# Patient Record
Sex: MALE | Race: ASIAN | NOT HISPANIC OR LATINO | URBAN - METROPOLITAN AREA
[De-identification: names, ages, dates, MRNs, and addresses within clinical notes are randomized per-mention and may not be internally consistent; named-entity substitution may affect disease eponyms.]

---

## 2023-12-10 ENCOUNTER — EMERGENCY (EMERGENCY)
Facility: HOSPITAL | Age: 28
LOS: 1 days | Discharge: HOME | End: 2023-12-10
Attending: EMERGENCY MEDICINE
Payer: MEDICAID

## 2023-12-10 VITALS
DIASTOLIC BLOOD PRESSURE: 79 MMHG | HEIGHT: 67 IN | OXYGEN SATURATION: 97 % | TEMPERATURE: 98 F | RESPIRATION RATE: 20 BRPM | WEIGHT: 110.01 LBS | HEART RATE: 89 BPM | SYSTOLIC BLOOD PRESSURE: 116 MMHG

## 2023-12-10 DIAGNOSIS — F43.20 ADJUSTMENT DISORDER, UNSPECIFIED: ICD-10-CM

## 2023-12-10 LAB
ALBUMIN SERPL ELPH-MCNC: 5.4 G/DL — HIGH (ref 3.3–5)
ALBUMIN SERPL ELPH-MCNC: 5.4 G/DL — HIGH (ref 3.3–5)
ALP SERPL-CCNC: 71 U/L — SIGNIFICANT CHANGE UP (ref 40–120)
ALP SERPL-CCNC: 71 U/L — SIGNIFICANT CHANGE UP (ref 40–120)
ALT FLD-CCNC: 9 U/L — LOW (ref 10–45)
ALT FLD-CCNC: 9 U/L — LOW (ref 10–45)
ANION GAP SERPL CALC-SCNC: 12 MMOL/L — SIGNIFICANT CHANGE UP (ref 5–17)
ANION GAP SERPL CALC-SCNC: 12 MMOL/L — SIGNIFICANT CHANGE UP (ref 5–17)
APAP SERPL-MCNC: <15 UG/ML — SIGNIFICANT CHANGE UP (ref 10–30)
APAP SERPL-MCNC: <15 UG/ML — SIGNIFICANT CHANGE UP (ref 10–30)
AST SERPL-CCNC: 14 U/L — SIGNIFICANT CHANGE UP (ref 10–40)
AST SERPL-CCNC: 14 U/L — SIGNIFICANT CHANGE UP (ref 10–40)
BASOPHILS # BLD AUTO: 0.05 K/UL — SIGNIFICANT CHANGE UP (ref 0–0.2)
BASOPHILS # BLD AUTO: 0.05 K/UL — SIGNIFICANT CHANGE UP (ref 0–0.2)
BASOPHILS NFR BLD AUTO: 1.3 % — SIGNIFICANT CHANGE UP (ref 0–2)
BASOPHILS NFR BLD AUTO: 1.3 % — SIGNIFICANT CHANGE UP (ref 0–2)
BILIRUB SERPL-MCNC: 1.2 MG/DL — SIGNIFICANT CHANGE UP (ref 0.2–1.2)
BILIRUB SERPL-MCNC: 1.2 MG/DL — SIGNIFICANT CHANGE UP (ref 0.2–1.2)
BUN SERPL-MCNC: 10 MG/DL — SIGNIFICANT CHANGE UP (ref 7–23)
BUN SERPL-MCNC: 10 MG/DL — SIGNIFICANT CHANGE UP (ref 7–23)
CALCIUM SERPL-MCNC: 9.9 MG/DL — SIGNIFICANT CHANGE UP (ref 8.4–10.5)
CALCIUM SERPL-MCNC: 9.9 MG/DL — SIGNIFICANT CHANGE UP (ref 8.4–10.5)
CHLORIDE SERPL-SCNC: 103 MMOL/L — SIGNIFICANT CHANGE UP (ref 96–108)
CHLORIDE SERPL-SCNC: 103 MMOL/L — SIGNIFICANT CHANGE UP (ref 96–108)
CO2 SERPL-SCNC: 26 MMOL/L — SIGNIFICANT CHANGE UP (ref 22–31)
CO2 SERPL-SCNC: 26 MMOL/L — SIGNIFICANT CHANGE UP (ref 22–31)
CREAT SERPL-MCNC: 0.88 MG/DL — SIGNIFICANT CHANGE UP (ref 0.5–1.3)
CREAT SERPL-MCNC: 0.88 MG/DL — SIGNIFICANT CHANGE UP (ref 0.5–1.3)
EGFR: 122 ML/MIN/1.73M2 — SIGNIFICANT CHANGE UP
EGFR: 122 ML/MIN/1.73M2 — SIGNIFICANT CHANGE UP
EOSINOPHIL # BLD AUTO: 0.02 K/UL — SIGNIFICANT CHANGE UP (ref 0–0.5)
EOSINOPHIL # BLD AUTO: 0.02 K/UL — SIGNIFICANT CHANGE UP (ref 0–0.5)
EOSINOPHIL NFR BLD AUTO: 0.5 % — SIGNIFICANT CHANGE UP (ref 0–6)
EOSINOPHIL NFR BLD AUTO: 0.5 % — SIGNIFICANT CHANGE UP (ref 0–6)
ETHANOL SERPL-MCNC: <10 MG/DL — SIGNIFICANT CHANGE UP (ref 0–10)
ETHANOL SERPL-MCNC: <10 MG/DL — SIGNIFICANT CHANGE UP (ref 0–10)
FLUAV AG NPH QL: SIGNIFICANT CHANGE UP
FLUAV AG NPH QL: SIGNIFICANT CHANGE UP
FLUBV AG NPH QL: SIGNIFICANT CHANGE UP
FLUBV AG NPH QL: SIGNIFICANT CHANGE UP
GLUCOSE SERPL-MCNC: 91 MG/DL — SIGNIFICANT CHANGE UP (ref 70–99)
GLUCOSE SERPL-MCNC: 91 MG/DL — SIGNIFICANT CHANGE UP (ref 70–99)
HCT VFR BLD CALC: 47.7 % — SIGNIFICANT CHANGE UP (ref 39–50)
HCT VFR BLD CALC: 47.7 % — SIGNIFICANT CHANGE UP (ref 39–50)
HGB BLD-MCNC: 16 G/DL — SIGNIFICANT CHANGE UP (ref 13–17)
HGB BLD-MCNC: 16 G/DL — SIGNIFICANT CHANGE UP (ref 13–17)
IMM GRANULOCYTES NFR BLD AUTO: 0.3 % — SIGNIFICANT CHANGE UP (ref 0–0.9)
IMM GRANULOCYTES NFR BLD AUTO: 0.3 % — SIGNIFICANT CHANGE UP (ref 0–0.9)
LYMPHOCYTES # BLD AUTO: 1.73 K/UL — SIGNIFICANT CHANGE UP (ref 1–3.3)
LYMPHOCYTES # BLD AUTO: 1.73 K/UL — SIGNIFICANT CHANGE UP (ref 1–3.3)
LYMPHOCYTES # BLD AUTO: 45.6 % — HIGH (ref 13–44)
LYMPHOCYTES # BLD AUTO: 45.6 % — HIGH (ref 13–44)
MCHC RBC-ENTMCNC: 30 PG — SIGNIFICANT CHANGE UP (ref 27–34)
MCHC RBC-ENTMCNC: 30 PG — SIGNIFICANT CHANGE UP (ref 27–34)
MCHC RBC-ENTMCNC: 33.5 GM/DL — SIGNIFICANT CHANGE UP (ref 32–36)
MCHC RBC-ENTMCNC: 33.5 GM/DL — SIGNIFICANT CHANGE UP (ref 32–36)
MCV RBC AUTO: 89.3 FL — SIGNIFICANT CHANGE UP (ref 80–100)
MCV RBC AUTO: 89.3 FL — SIGNIFICANT CHANGE UP (ref 80–100)
MONOCYTES # BLD AUTO: 0.32 K/UL — SIGNIFICANT CHANGE UP (ref 0–0.9)
MONOCYTES # BLD AUTO: 0.32 K/UL — SIGNIFICANT CHANGE UP (ref 0–0.9)
MONOCYTES NFR BLD AUTO: 8.4 % — SIGNIFICANT CHANGE UP (ref 2–14)
MONOCYTES NFR BLD AUTO: 8.4 % — SIGNIFICANT CHANGE UP (ref 2–14)
NEUTROPHILS # BLD AUTO: 1.66 K/UL — LOW (ref 1.8–7.4)
NEUTROPHILS # BLD AUTO: 1.66 K/UL — LOW (ref 1.8–7.4)
NEUTROPHILS NFR BLD AUTO: 43.9 % — SIGNIFICANT CHANGE UP (ref 43–77)
NEUTROPHILS NFR BLD AUTO: 43.9 % — SIGNIFICANT CHANGE UP (ref 43–77)
NRBC # BLD: 0 /100 WBCS — SIGNIFICANT CHANGE UP (ref 0–0)
NRBC # BLD: 0 /100 WBCS — SIGNIFICANT CHANGE UP (ref 0–0)
PLATELET # BLD AUTO: 263 K/UL — SIGNIFICANT CHANGE UP (ref 150–400)
PLATELET # BLD AUTO: 263 K/UL — SIGNIFICANT CHANGE UP (ref 150–400)
POTASSIUM SERPL-MCNC: 4.4 MMOL/L — SIGNIFICANT CHANGE UP (ref 3.5–5.3)
POTASSIUM SERPL-MCNC: 4.4 MMOL/L — SIGNIFICANT CHANGE UP (ref 3.5–5.3)
POTASSIUM SERPL-SCNC: 4.4 MMOL/L — SIGNIFICANT CHANGE UP (ref 3.5–5.3)
POTASSIUM SERPL-SCNC: 4.4 MMOL/L — SIGNIFICANT CHANGE UP (ref 3.5–5.3)
PROT SERPL-MCNC: 8.4 G/DL — HIGH (ref 6–8.3)
PROT SERPL-MCNC: 8.4 G/DL — HIGH (ref 6–8.3)
RBC # BLD: 5.34 M/UL — SIGNIFICANT CHANGE UP (ref 4.2–5.8)
RBC # BLD: 5.34 M/UL — SIGNIFICANT CHANGE UP (ref 4.2–5.8)
RBC # FLD: 12.3 % — SIGNIFICANT CHANGE UP (ref 10.3–14.5)
RBC # FLD: 12.3 % — SIGNIFICANT CHANGE UP (ref 10.3–14.5)
RSV RNA NPH QL NAA+NON-PROBE: SIGNIFICANT CHANGE UP
RSV RNA NPH QL NAA+NON-PROBE: SIGNIFICANT CHANGE UP
SALICYLATES SERPL-MCNC: <2 MG/DL — LOW (ref 15–30)
SALICYLATES SERPL-MCNC: <2 MG/DL — LOW (ref 15–30)
SARS-COV-2 RNA SPEC QL NAA+PROBE: SIGNIFICANT CHANGE UP
SARS-COV-2 RNA SPEC QL NAA+PROBE: SIGNIFICANT CHANGE UP
SODIUM SERPL-SCNC: 141 MMOL/L — SIGNIFICANT CHANGE UP (ref 135–145)
SODIUM SERPL-SCNC: 141 MMOL/L — SIGNIFICANT CHANGE UP (ref 135–145)
TSH SERPL-MCNC: 0.85 UIU/ML — SIGNIFICANT CHANGE UP (ref 0.27–4.2)
TSH SERPL-MCNC: 0.85 UIU/ML — SIGNIFICANT CHANGE UP (ref 0.27–4.2)
WBC # BLD: 3.79 K/UL — LOW (ref 3.8–10.5)
WBC # BLD: 3.79 K/UL — LOW (ref 3.8–10.5)
WBC # FLD AUTO: 3.79 K/UL — LOW (ref 3.8–10.5)
WBC # FLD AUTO: 3.79 K/UL — LOW (ref 3.8–10.5)

## 2023-12-10 PROCEDURE — 80053 COMPREHEN METABOLIC PANEL: CPT

## 2023-12-10 PROCEDURE — 84443 ASSAY THYROID STIM HORMONE: CPT

## 2023-12-10 PROCEDURE — 99285 EMERGENCY DEPT VISIT HI MDM: CPT

## 2023-12-10 PROCEDURE — 80307 DRUG TEST PRSMV CHEM ANLYZR: CPT

## 2023-12-10 PROCEDURE — 87637 SARSCOV2&INF A&B&RSV AMP PRB: CPT

## 2023-12-10 PROCEDURE — 36415 COLL VENOUS BLD VENIPUNCTURE: CPT

## 2023-12-10 PROCEDURE — 85025 COMPLETE CBC W/AUTO DIFF WBC: CPT

## 2023-12-10 PROCEDURE — 93005 ELECTROCARDIOGRAM TRACING: CPT

## 2023-12-10 NOTE — ED PROVIDER NOTE - PROGRESS NOTE DETAILS
Jade Cervanets DO PGY-3  Discussed with psych, cleared for discharge home. Patient denies SI, had collateral obtained and no safety concerns from family who lives with patient.     Discussed the plan for discharge home with the patient. Advised return precautions for any alarming or worsening symptoms, or any other concerns. Recommended that the patient call their primary care doctor today, inform them of their visit to the ER, and to obtain repeat evaluation from their PCP in the next 1-3 days. Patient expresses understanding and agrees with the plan for follow up. At the time of discharge the patient remained stable, in no acute distress. Jade Cervantes DO PGY-3  Discussed with psych, cleared for discharge home. Patient denies SI, had collateral obtained and no safety concerns from family who lives with patient.     Discussed the plan for discharge home with the patient. Advised return precautions for any alarming or worsening symptoms, or any other concerns. Recommended that the patient call their primary care doctor today, inform them of their visit to the ER, and to obtain repeat evaluation from their PCP in the next 1-3 days. Patient expresses understanding and agrees with the plan for follow up. At the time of discharge the patient remained stable, in no acute distress.

## 2023-12-10 NOTE — ED BEHAVIORAL HEALTH ASSESSMENT NOTE - SAFETY PLAN ADDT'L DETAILS
Education provided regarding environmental safety / lethal means restriction/Provision of National Suicide Prevention Lifeline 9-223-366-TALK (1269) Education provided regarding environmental safety / lethal means restriction/Provision of National Suicide Prevention Lifeline 0-001-530-TALK (7959)

## 2023-12-10 NOTE — ED PROVIDER NOTE - ATTENDING CONTRIBUTION TO CARE
Patient  is a 28-year-old male with no chronic medical problems here for psychiatric evaluation secondary to 6 months of depressive symptoms and suicidal thoughts.  Patient states he does not take any medications, does not see a therapist or psychiatrist but wants to be evaluated and is open to treatment.  He states he has 2 dogs and his neighbor recently reported his animals 3 days ago and he needs an emotional support letter to keep one of his dogs because it would "kill him" if he had to get rid of his dogs.  He reports that everything started after a relationship he was in with a woman in [place] did not work out.  He states he spent a lot of money, used up his savings and his girlfriend in [place] broke up with him.  He denies any drug use.  He reports drinking about once a week. Patient states he has lost about 10 to 20 pounds in the past 6 months.      VS noted  Gen.  no acute distress, flat affect  HEENT: EOMI, mmm  Lungs: CTAB/L no C/ W /R   CVS: RRR   Abd; Soft non tender, non distended   Ext: no edema  Skin: no rash  Neuro AAOx3 non focal clear speech  a/p: depression, suicidal ideations -   Concern for major depression.  Patient has a flat affect.  He is requesting psychiatric evaluation for his symptoms.  Plan for medical clearance and psych consult.  - Jacinto FIGUEROA

## 2023-12-10 NOTE — ED BEHAVIORAL HEALTH ASSESSMENT NOTE - CURRENT PLAN:
Quality 110: Preventive Care And Screening: Influenza Immunization: Influenza immunization was not ordered or administered, reason not given
Quality 410: Psoriasis Clinical Response To Oral Systemic Or Biologic Mediations: Patient has been on biologic or system therapy for less than 6 months
Detail Level: Detailed
None known

## 2023-12-10 NOTE — ED BEHAVIORAL HEALTH ASSESSMENT NOTE - HPI (INCLUDE ILLNESS QUALITY, SEVERITY, DURATION, TIMING, CONTEXT, MODIFYING FACTORS, ASSOCIATED SIGNS AND SYMPTOMS)
This is a 28M with no past medical or psychiatric history BIB self to the ED for suicidal thoughts. Psychiatry consulted to assess for inpatient admission secondary to suicidal ideation.  Chart reviewed. Patient assessed at bedside. Patient stated he has been living with feelings of depression and anxiety (ruminations, anxiety, anhedonia, amotivation) for many years, but feelings began to worsen approximately 9 months ago after his girlfriend broke up with him. At that time he had passive thoughts of death, no intent or plan, but then started taking care of pets (2 dogs and a cat) who he credits for helping him regain motivation and energy to go outside and be social. He previously had therapy in college but did not seek psychotherapy after graduation. He denied past SA, psychiatric medication treatment, psychiatric hospitalizations or family history of SA or psychiatric conditions.  He currently denies SI/HI, AH/VH. He denied firearms possession.     After extensive interviewing, he stated his goal in coming to the ED was to attain a Emotional Support Animal (CHECO) support letter to provide to the landlord so he can keep his pets. Patient showed writer a cell phone picture of the letter they had sent him requesting a response by 12/8/2023 or the matter will be forwarded to the building's legal team. Patient was amenable to requesting said letter from his PCP or after establishing OP care at [facility].    Collateral contacted (several numbers provided: Tc, friend ([phone]), rToy, former coworker and friend ([phone]) and mother Ms. Masterson, [phone] (home number in sunrise is incorrect)), was able to contact Tc, who stated that patient was depressed but depression was not severe, and he was happy that he was amenable to therapy. Tc stated that he had no concerns about patient's safety. This is a 28M with no past medical or psychiatric history BIB self to the ED for suicidal thoughts. Psychiatry consulted to assess for inpatient admission secondary to suicidal ideation.  Chart reviewed. Patient assessed at bedside. Patient stated he has been living with feelings of depression and anxiety (ruminations, anxiety, anhedonia, amotivation) for many years, but feelings began to worsen approximately 9 months ago after his girlfriend broke up with him. At that time he had passive thoughts of death, no intent or plan, but then started taking care of pets (2 dogs and a cat) who he credits for helping him regain motivation and energy to go outside and be social. He previously had therapy in college but did not seek psychotherapy after graduation. He denied past SA, psychiatric medication treatment, psychiatric hospitalizations or family history of SA or psychiatric conditions.  He currently denies SI/HI, AH/VH. He denied firearms possession.     After extensive interviewing, he stated his goal in coming to the ED was to attain a Emotional Support Animal (CHECO) support letter to provide to the landlord so he can keep his pets. Patient showed writer a cell phone picture of the letter they had sent him requesting a response by 12/8/2023 or the matter will be forwarded to the building's legal team. Patient was amenable to requesting said letter from his PCP or after establishing OP care at [facility].    Collateral contacted (several numbers provided: Tc, friend ([phone]), Troy, former coworker and friend ([phone]) and mother Ms. Masterson, [phone] (home number in sunrise is incorrect)), was able to contact Tc, who stated that patient was depressed but depression was not severe, and he was happy that he was amenable to therapy. Tc stated that he had no concerns about patient's safety.

## 2023-12-10 NOTE — ED BEHAVIORAL HEALTH ASSESSMENT NOTE - REFERRAL / APPOINTMENT DETAILS
[facility] crisis clinic [facility] crisis clinic

## 2023-12-10 NOTE — ED ADULT NURSE NOTE - NSFALLUNIVINTERV_ED_ALL_ED
Bed/Stretcher in lowest position, wheels locked, appropriate side rails in place/Call bell, personal items and telephone in reach/Instruct patient to call for assistance before getting out of bed/chair/stretcher/Non-slip footwear applied when patient is off stretcher/[place] to call system/Physically safe environment - no spills, clutter or unnecessary equipment/Purposeful proactive rounding/Room/bathroom lighting operational, light cord in reach Bed/Stretcher in lowest position, wheels locked, appropriate side rails in place/Call bell, personal items and telephone in reach/Instruct patient to call for assistance before getting out of bed/chair/stretcher/Non-slip footwear applied when patient is off stretcher/[place] to call system/Physically safe environment - no spills, clutter or unnecessary equipment/Purposeful proactive rounding/Room/bathroom lighting operational, light cord in reach

## 2023-12-10 NOTE — ED PROVIDER NOTE - NSFOLLOWUPINSTRUCTIONS_ED_ALL_ED_FT
An Crisis Center on [facility] Information:    -Walk-in hours: Monday to Friday, 9am to 3pm   -Almost all walk-in patients will be able to see a psych prescriber the same day   -Scheduled, non-urgent, evening remote/virtual appointments are available on a limited basis. Call our  to inquire about these: [phone]. A crisis center clinician screens these requests in the late afternoon and if appropriate it takes a few days to set-up.   -Visits take about 2 to 4 hours total   -Mornings are the best time for patients to arrive    For Telehealth options try:  [facility]: [email or web address] (to access psychiatrist or therapist)  [facility]: [email or web address] (to access psychiatrist or therapist)  [facility]: [email or web address] (Largest online therapy group) An Crisis Center on [facility] Information:    -Walk-in hours: Monday to Friday, 9am to 3pm   -Almost all walk-in patients will be able to see a psych prescriber the same day   -Scheduled, non-urgent, evening remote/virtual appointments are available on a limited basis. Call our  to inquire about these: [phone]. A crisis center clinician screens these requests in the late afternoon and if appropriate it takes a few days to set-up.   -Visits take about 2 to 4 hours total   -Mornings are the best time for patients to arrive    For Telehealth options try:  [employer] [employer]: [email or web address] (to access psychiatrist or therapist)  [facility]: [email or web address] (to access psychiatrist or therapist)  [facility]: [email or web address] (Largest online therapy group)

## 2023-12-10 NOTE — ED BEHAVIORAL HEALTH ASSESSMENT NOTE - RISK ASSESSMENT
Risk factors: not receiving treatment, ?legal problems, history of trauma, acute psychosocial stressors    Protective factors: no current SIIP/HIIP, no h/o SA/SIB, no h/o psych admissions, no access to weapons, no active substance use disorder, good physical health, domiciled, help-seeking behaviors  At this time, patient is at low imminent risk of harm to self or others and does not meet criteria for inpatient admission.

## 2023-12-10 NOTE — ED ADULT NURSE REASSESSMENT NOTE - NS ED NURSE REASSESS COMMENT FT1
Security at bedside securing belongings, patient wanded. Valuables secured in safe envelope [id number]. Dr. Cervantes states OK for patient to keep eye glasses. Security at bedside securing belongings, patient wanded. Valuables secured in safe envelope [id number]. Dr. Cervantes states OK for patient to keep eye glasses.

## 2023-12-10 NOTE — ED PROVIDER NOTE - PATIENT PORTAL LINK FT
You can access the FollowMyHealth Patient Portal offered by [facility] by registering at the following website: [email or web address]. By joining [facility]’s FollowMyHealth portal, you will also be able to view your health information using other applications (apps) compatible with our system. You can access the FollowMyHealth Patient Portal offered by [facility] by registering at the following website: [email or web address]. By joining [facility]’s FollowMyHealth portal, you will also be able to view your health information using other applications (apps) compatible with our system.
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
## 2023-12-10 NOTE — ED BEHAVIORAL HEALTH ASSESSMENT NOTE - NSBHATTESTCOMMENTATTENDFT_PSY_A_CORE
This is a 28M with no past medical or psychiatric history BIB self to the ED for suicidal thoughts. Psychiatry consulted to assess for inpatient admission secondary to suicidal ideation.  Chart reviewed. Patient assessed at bedside. Patient stated he has been living with feelings of depression and anxiety (ruminations, anxiety, anhedonia, amotivation) for many years, but feelings began to worsen approximately 9 months ago after his girlfriend broke up with him. At that time he had passive thoughts of death, no intent or plan, but then started taking care of pets (2 dogs and a cat) who he credits for helping him regain motivation and energy to go outside and be social. He previously had therapy in college but did not seek psychotherapy after graduation. He denied past SA, psychiatric medication treatment, psychiatric hospitalizations or family history of SA or psychiatric conditions.  He currently denies SI/HI, AH/VH. He denied firearms possession.     After extensive interviewing, he stated his goal in coming to the ED was to attain a Emotional Support Animal (CHECO) support letter to provide to the landlord so he can keep his pets. Patient showed writer a cell phone picture of the letter they had sent him requesting a response by 12/8/2023 or the matter will be forwarded to the building's legal team. Patient was amenable to requesting said letter from his PCP or after establishing OP care at [facility]. pt can be d/c home This is a 28M with no past medical or psychiatric history BIB self to the ED for suicidal thoughts. Psychiatry consulted to assess for inpatient admission secondary to suicidal ideation.  Chart reviewed. Patient assessed at bedside. Patient stated he has been living with feelings of depression and anxiety (ruminations, anxiety, anhedonia, amotivation) for many years, but feelings began to worsen approximately 9 months ago after his girlfriend broke up with him. At that time he had passive thoughts of death, no intent or plan, but then started taking care of pets (2 dogs and a cat) who he credits for helping him regain motivation and energy to go outside and be social. He previously had therapy in college but did not seek psychotherapy after graduation. He denied past SA, psychiatric medication treatment, psychiatric hospitalizations or family history of SA or psychiatric conditions.  He currently denies SI/HI, AH/VH. He denied firearms possession.     After extensive interviewing, he stated his goal in coming to the ED was to attain a Emotional Support Animal (CHECO) support letter to provide to the landlord so he can keep his pets. Patient showed writer a cell phone picture of the letter they had sent him requesting a response by 12/8/2023 or the matter will be forwarded to the building's legal team. Patient was amenable to requesting said letter from his PCP or after establishing OP care at [facility]. pt can be d/c home
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
## 2023-12-10 NOTE — ED PROVIDER NOTE - CLINICAL SUMMARY MEDICAL DECISION MAKING FREE TEXT BOX
Jade Cervantes DO PGY-3  HPI:   28-year-old male with no past medical history presents to the ED with passive suicidal ideation for the past 6 months associated with depression, decreased interest in doing things, anhedonia, difficulty sleeping, anxiety.  Patient reports that he had a break up with a girlfriend in [place] about 6 months ago after he went to [place] to see her and spent all of his savings. Patient reports 20 lb weight loss, poor appetite over the past 6 months.     Reports he drinks 2-3 beers infrequently on the weekends, nonsmoker, denies drug use.     ROS:   Denies fever, chills, chest pain, shortness of breath, abdominal pain, nausea, vomiting, diarrhea, dysuria, hematuria    PHYSICAL EXAM:  CONSTITUTIONAL: Well appearing, awake, alert, oriented to person, place, time/situation and in no apparent distress.  HEAD: Atraumatic, no step offs.  NECK: Supple, no meningismus.   EYES: Clear bilaterally, pupils equal, round and reactive to light.  ENMT: Airway patent, Nasal mucosa clear. Mouth with normal mucosa. Uvula is midline.   CARDIAC: Normal rate, regular rhythm. +S1/S2. No murmurs, rubs or gallops.  RESPIRATORY: Breathing unlabored. Breath sounds clear and equal bilaterally.  ABDOMEN:  Soft, nontender, nondistended. No rebound tenderness or guarding.  FLANK: No CVA tenderness B/L.  NEUROLOGICAL: Alert and oriented, no focal deficits, no motor or sensory deficits.   MSK: No clubbing, cyanosis, or edema.   SKIN: Skin warm and dry.     MDM:   28 year old male with no known PMH presents to the ED with depression and suicidal ideation, significant weight loss, anhedonia.   Nontoxic appearing, hemodynamically stable, no focal findings on exam.  Differential includes but is not limited to major depressive disorder, suicidal thoughts, will screen for underlying medical etiology.   Plan to obtain labs, EKG, psych consult.

## 2023-12-10 NOTE — ED BEHAVIORAL HEALTH ASSESSMENT NOTE - SUMMARY
This is a 28M with no past medical or psychiatric history BIB self to the ED for suicidal thoughts. Psychiatry consulted to assess for inpatient admission secondary to suicidal ideation. At time of assessment, pt was without perceptual disturbances, persecutory delusions, disorganized behavior, depression, anxiety, hopelessness, worthlessness, irritability, flight of ideas, suicidality or homicidality, based on history and collateral, presentation most consistent with secondary gain. At this time, patient is at low imminent risk of harm to self or others and does not meet criteria for inpatient admission.
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
